# Patient Record
Sex: FEMALE | Race: WHITE | URBAN - METROPOLITAN AREA
[De-identification: names, ages, dates, MRNs, and addresses within clinical notes are randomized per-mention and may not be internally consistent; named-entity substitution may affect disease eponyms.]

---

## 2017-11-16 ENCOUNTER — HOSPITAL ENCOUNTER (EMERGENCY)
Facility: CLINIC | Age: 22
Discharge: HOME | End: 2017-11-16
Attending: PHYSICIAN ASSISTANT | Admitting: PHYSICIAN ASSISTANT

## 2017-11-16 ENCOUNTER — APPOINTMENT (OUTPATIENT)
Dept: ULTRASOUND IMAGING | Facility: CLINIC | Age: 22
End: 2017-11-16
Attending: PHYSICIAN ASSISTANT

## 2017-11-16 VITALS
DIASTOLIC BLOOD PRESSURE: 59 MMHG | SYSTOLIC BLOOD PRESSURE: 95 MMHG | TEMPERATURE: 98.4 F | RESPIRATION RATE: 16 BRPM | OXYGEN SATURATION: 100 %

## 2017-11-16 DIAGNOSIS — K62.5 RECTAL BLEEDING: ICD-10-CM

## 2017-11-16 DIAGNOSIS — J06.9 UPPER RESPIRATORY TRACT INFECTION, UNSPECIFIED TYPE: ICD-10-CM

## 2017-11-16 DIAGNOSIS — Z3A.10 10 WEEKS GESTATION OF PREGNANCY: ICD-10-CM

## 2017-11-16 DIAGNOSIS — R82.71 ASYMPTOMATIC BACTERIURIA DURING PREGNANCY: ICD-10-CM

## 2017-11-16 DIAGNOSIS — O99.891 ASYMPTOMATIC BACTERIURIA DURING PREGNANCY: ICD-10-CM

## 2017-11-16 LAB
ALBUMIN SERPL-MCNC: 3.7 G/DL (ref 3.4–5)
ALBUMIN UR-MCNC: NEGATIVE MG/DL
ALP SERPL-CCNC: 54 U/L (ref 40–150)
ALT SERPL W P-5'-P-CCNC: 14 U/L (ref 0–50)
ANION GAP SERPL CALCULATED.3IONS-SCNC: 8 MMOL/L (ref 3–14)
APPEARANCE UR: ABNORMAL
AST SERPL W P-5'-P-CCNC: 8 U/L (ref 0–45)
B-HCG SERPL-ACNC: ABNORMAL IU/L (ref 0–5)
BACTERIA #/AREA URNS HPF: ABNORMAL /HPF
BASOPHILS # BLD AUTO: 0 10E9/L (ref 0–0.2)
BASOPHILS NFR BLD AUTO: 0.2 %
BILIRUB SERPL-MCNC: 0.5 MG/DL (ref 0.2–1.3)
BILIRUB UR QL STRIP: NEGATIVE
BUN SERPL-MCNC: 9 MG/DL (ref 7–30)
CALCIUM SERPL-MCNC: 9 MG/DL (ref 8.5–10.1)
CHLORIDE SERPL-SCNC: 105 MMOL/L (ref 94–109)
CO2 SERPL-SCNC: 24 MMOL/L (ref 20–32)
COLOR UR AUTO: YELLOW
CREAT SERPL-MCNC: 0.67 MG/DL (ref 0.52–1.04)
DEPRECATED S PYO AG THROAT QL EIA: NORMAL
DIFFERENTIAL METHOD BLD: NORMAL
EOSINOPHIL # BLD AUTO: 0 10E9/L (ref 0–0.7)
EOSINOPHIL NFR BLD AUTO: 0.6 %
ERYTHROCYTE [DISTWIDTH] IN BLOOD BY AUTOMATED COUNT: 12.4 % (ref 10–15)
FLUAV+FLUBV AG SPEC QL: NEGATIVE
FLUAV+FLUBV AG SPEC QL: NEGATIVE
GFR SERPL CREATININE-BSD FRML MDRD: >90 ML/MIN/1.7M2
GLUCOSE SERPL-MCNC: 92 MG/DL (ref 70–99)
GLUCOSE UR STRIP-MCNC: NEGATIVE MG/DL
HCG UR QL: POSITIVE
HCT VFR BLD AUTO: 40.4 % (ref 35–47)
HGB BLD-MCNC: 14 G/DL (ref 11.7–15.7)
HGB UR QL STRIP: NEGATIVE
IMM GRANULOCYTES # BLD: 0 10E9/L (ref 0–0.4)
IMM GRANULOCYTES NFR BLD: 0.3 %
KETONES UR STRIP-MCNC: NEGATIVE MG/DL
LEUKOCYTE ESTERASE UR QL STRIP: ABNORMAL
LYMPHOCYTES # BLD AUTO: 1.2 10E9/L (ref 0.8–5.3)
LYMPHOCYTES NFR BLD AUTO: 17.6 %
MCH RBC QN AUTO: 29.8 PG (ref 26.5–33)
MCHC RBC AUTO-ENTMCNC: 34.7 G/DL (ref 31.5–36.5)
MCV RBC AUTO: 86 FL (ref 78–100)
MONOCYTES # BLD AUTO: 0.3 10E9/L (ref 0–1.3)
MONOCYTES NFR BLD AUTO: 5.1 %
MUCOUS THREADS #/AREA URNS LPF: PRESENT /LPF
NEUTROPHILS # BLD AUTO: 5.1 10E9/L (ref 1.6–8.3)
NEUTROPHILS NFR BLD AUTO: 76.2 %
NITRATE UR QL: NEGATIVE
NRBC # BLD AUTO: 0 10*3/UL
NRBC BLD AUTO-RTO: 0 /100
PH UR STRIP: 6 PH (ref 5–7)
PLATELET # BLD AUTO: 250 10E9/L (ref 150–450)
POTASSIUM SERPL-SCNC: 3.4 MMOL/L (ref 3.4–5.3)
PROT SERPL-MCNC: 7.7 G/DL (ref 6.8–8.8)
RBC # BLD AUTO: 4.7 10E12/L (ref 3.8–5.2)
RBC #/AREA URNS AUTO: 1 /HPF (ref 0–2)
SODIUM SERPL-SCNC: 137 MMOL/L (ref 133–144)
SOURCE: ABNORMAL
SP GR UR STRIP: 1.03 (ref 1–1.03)
SPECIMEN SOURCE: NORMAL
SQUAMOUS #/AREA URNS AUTO: 11 /HPF (ref 0–1)
UROBILINOGEN UR STRIP-MCNC: 0 MG/DL (ref 0–2)
WBC # BLD AUTO: 6.6 10E9/L (ref 4–11)
WBC #/AREA URNS AUTO: 2 /HPF (ref 0–2)
WET PREP SPEC: NORMAL

## 2017-11-16 PROCEDURE — 87491 CHLMYD TRACH DNA AMP PROBE: CPT | Performed by: PHYSICIAN ASSISTANT

## 2017-11-16 PROCEDURE — 84702 CHORIONIC GONADOTROPIN TEST: CPT | Performed by: PHYSICIAN ASSISTANT

## 2017-11-16 PROCEDURE — 99285 EMERGENCY DEPT VISIT HI MDM: CPT | Mod: 25

## 2017-11-16 PROCEDURE — 81001 URINALYSIS AUTO W/SCOPE: CPT | Performed by: PHYSICIAN ASSISTANT

## 2017-11-16 PROCEDURE — 76801 OB US < 14 WKS SINGLE FETUS: CPT

## 2017-11-16 PROCEDURE — 93005 ELECTROCARDIOGRAM TRACING: CPT

## 2017-11-16 PROCEDURE — 96375 TX/PRO/DX INJ NEW DRUG ADDON: CPT

## 2017-11-16 PROCEDURE — 87591 N.GONORRHOEAE DNA AMP PROB: CPT | Performed by: PHYSICIAN ASSISTANT

## 2017-11-16 PROCEDURE — 81025 URINE PREGNANCY TEST: CPT | Performed by: PHYSICIAN ASSISTANT

## 2017-11-16 PROCEDURE — 87880 STREP A ASSAY W/OPTIC: CPT | Performed by: PHYSICIAN ASSISTANT

## 2017-11-16 PROCEDURE — 25000128 H RX IP 250 OP 636: Performed by: PHYSICIAN ASSISTANT

## 2017-11-16 PROCEDURE — 80053 COMPREHEN METABOLIC PANEL: CPT | Performed by: PHYSICIAN ASSISTANT

## 2017-11-16 PROCEDURE — 87081 CULTURE SCREEN ONLY: CPT | Performed by: PHYSICIAN ASSISTANT

## 2017-11-16 PROCEDURE — 87086 URINE CULTURE/COLONY COUNT: CPT | Performed by: PHYSICIAN ASSISTANT

## 2017-11-16 PROCEDURE — 87210 SMEAR WET MOUNT SALINE/INK: CPT | Performed by: PHYSICIAN ASSISTANT

## 2017-11-16 PROCEDURE — 85025 COMPLETE CBC W/AUTO DIFF WBC: CPT | Performed by: PHYSICIAN ASSISTANT

## 2017-11-16 PROCEDURE — 96374 THER/PROPH/DIAG INJ IV PUSH: CPT

## 2017-11-16 PROCEDURE — 87804 INFLUENZA ASSAY W/OPTIC: CPT | Performed by: PHYSICIAN ASSISTANT

## 2017-11-16 PROCEDURE — 96361 HYDRATE IV INFUSION ADD-ON: CPT

## 2017-11-16 RX ORDER — NITROFURANTOIN 25; 75 MG/1; MG/1
100 CAPSULE ORAL 2 TIMES DAILY
Qty: 14 CAPSULE | Refills: 0 | Status: SHIPPED | OUTPATIENT
Start: 2017-11-16

## 2017-11-16 RX ORDER — METOCLOPRAMIDE HYDROCHLORIDE 5 MG/ML
10 INJECTION INTRAMUSCULAR; INTRAVENOUS ONCE
Status: COMPLETED | OUTPATIENT
Start: 2017-11-16 | End: 2017-11-16

## 2017-11-16 RX ORDER — DOXYLAMINE SUCCINATE AND PYRIDOXINE HYDROCHLORIDE, DELAYED RELEASE TABLETS 10 MG/10 MG 10; 10 MG/1; MG/1
2 TABLET, DELAYED RELEASE ORAL 2 TIMES DAILY PRN
Qty: 30 TABLET | Refills: 0 | Status: SHIPPED | OUTPATIENT
Start: 2017-11-16

## 2017-11-16 RX ORDER — DIPHENHYDRAMINE HYDROCHLORIDE 50 MG/ML
25 INJECTION INTRAMUSCULAR; INTRAVENOUS ONCE
Status: COMPLETED | OUTPATIENT
Start: 2017-11-16 | End: 2017-11-16

## 2017-11-16 RX ADMIN — SODIUM CHLORIDE 1000 ML: 9 INJECTION, SOLUTION INTRAVENOUS at 20:20

## 2017-11-16 RX ADMIN — METOCLOPRAMIDE 10 MG: 5 INJECTION, SOLUTION INTRAMUSCULAR; INTRAVENOUS at 21:23

## 2017-11-16 RX ADMIN — DIPHENHYDRAMINE HYDROCHLORIDE 25 MG: 50 INJECTION, SOLUTION INTRAMUSCULAR; INTRAVENOUS at 21:23

## 2017-11-16 ASSESSMENT — ENCOUNTER SYMPTOMS
DIZZINESS: 1
COUGH: 1
BLOOD IN STOOL: 1
ROS GI COMMENTS: RECTAL PAIN
SORE THROAT: 1

## 2017-11-16 NOTE — ED AVS SNAPSHOT
[facility]    [address]    [facility] [address]    Phone:  [phone]    Fax:  [phone]                                       Shira Keys   MRN: [id number]    Department:  [facility]   Date of Visit:  11/16/2017           After Visit Summary Signature Page     I have received my discharge instructions, and my questions have been answered. I have discussed any challenges I see with this plan with the nurse or doctor.    ..........................................................................................................................................  Patient/Patient Representative Signature      ..........................................................................................................................................  Patient Representative Print Name and Relationship to Patient    ..................................................               ................................................  Date                                            Time    ..........................................................................................................................................  Reviewed by Signature/Title    ...................................................              ..............................................  Date                                                            Time

## 2017-11-16 NOTE — ED AVS SNAPSHOT
[facility] Emergency Department    [address]    [facility] [address]    Phone:  [phone]    Fax:  [phone]                                       Shira Keys   MRN: [id number]    Department:  [facility] Emergency Department   Date of Visit:  11/16/2017           Patient Information     Date Of Birth          1995        Your diagnoses for this visit were:     10 weeks gestation of pregnancy     Upper respiratory tract infection, unspecified type     Rectal bleeding, likely internal hemorrhoids     Asymptomatic bacteriuria during pregnancy        You were seen by Zeina Santos PA-C.      Follow-up Information     Follow up with [facility].    Specialty:  EMERGENCY MEDICINE    Why:  If symptoms worsen    [facility]:    [address] [facility] [address] [phone]        Follow up with Erik Perez MD.    Specialty:  OB/Gyn    Contact information:    [address] [place] [address]  [phone]          Follow up with [facility]    Contact information:    [address] [place] [phone]        Discharge Instructions       Rest, fluids, diclegis for nausea, follow up with OB GYN for recheck.   You did have small amount of bacteria in urine. We always treat this in pregnant women. Start macrobid.   For upper respiratory symptoms - supportive measures, tylenol for discomfort, neti pots.   For rectal bleeding - likely internal hemorrhoids, increase fiber to avoid straining, see colorectal if not improving.         24 Hour Appointment Hotline       To make an appointment at any [facility], call [phone] ([phone]). If you don't have a family doctor or clinic, we will help you find one. [facility] are conveniently located to serve the needs of you and your family.             Review of your medicines       START taking        Dose / Directions Last dose taken    Doxylamine-Pyridoxine 10-10 MG Tbec   Commonly known as:  DICLEGIS   Dose:  2 tablet   Quantity:  30 tablet        Take 2 tablets by mouth 2 times daily as needed   Refills:  0        nitroFURantoin (macrocrystal-monohydrate) 100 MG capsule   Commonly known as:  MACROBID   Dose:  100 mg   Quantity:  14 capsule        Take 1 capsule (100 mg) by mouth 2 times daily   Refills:  0                Prescriptions were sent or printed at these locations (2 Prescriptions)                   Other Prescriptions                Printed at Department/Unit printer (2 of 2)         Doxylamine-Pyridoxine (DICLEGIS) 10-10 MG TBEC               nitroFURantoin, macrocrystal-monohydrate, (MACROBID) 100 MG capsule                Procedures and tests performed during your visit     Beta strep group A culture    CBC + differential    Chlamydia trachomatis PCR    Comprehensive metabolic panel    EKG 12 lead    HCG qualitative urine    HCG quantitative pregnancy    IV access    Influenza A/B antigen    Neisseria gonorrhoeae PCR    OB  US 1st trimester w transvag    Orthostatic blood pressure and pulse    Rapid strep screen    UA with Microscopic    Urine Culture    Wet prep      Orders Needing Specimen Collection     None      Pending Results     Date and Time Order Name Status Description    11/16/2017 2223 Urine Culture Preliminary     11/16/2017 2018 Beta strep group A culture Preliminary             Pending Culture Results     Date and Time Order Name Status Description    11/16/2017 2223 Urine Culture Preliminary     11/16/2017 2018 Beta strep group A culture Preliminary             Pending Results Instructions     If you had any lab results that were not finalized at the time of your Discharge, you can call the ED Lab Result RN at [phone]. You will be contacted by this team for any positive Lab results or changes in treatment. The nurses are available 7 days a week from [facility]  to 6:30P.  You can leave a message 24 hours per day and they will return your call.        Test Results From Your Hospital Stay        11/16/2017  8:33 PM      Component Results     Component Value Ref Range & Units Status    WBC 6.6 4.0 - 11.0 10e9/L Final    RBC Count 4.70 3.8 - 5.2 10e12/L Final    Hemoglobin 14.0 11.7 - 15.7 g/dL Final    Hematocrit 40.4 35.0 - 47.0 % Final    MCV 86 78 - 100 fl Final    MCH 29.8 26.5 - 33.0 pg Final    MCHC 34.7 31.5 - 36.5 g/dL Final    RDW 12.4 10.0 - 15.0 % Final    Platelet Count 250 150 - 450 10e9/L Final    Diff Method Automated Method  Final    % Neutrophils 76.2 % Final    % Lymphocytes 17.6 % Final    % Monocytes 5.1 % Final    % Eosinophils 0.6 % Final    % Basophils 0.2 % Final    % Immature Granulocytes 0.3 % Final    Nucleated RBCs 0 0 /100 Final    Absolute Neutrophil 5.1 1.6 - 8.3 10e9/L Final    Absolute Lymphocytes 1.2 0.8 - 5.3 10e9/L Final    Absolute Monocytes 0.3 0.0 - 1.3 10e9/L Final    Absolute Eosinophils 0.0 0.0 - 0.7 10e9/L Final    Absolute Basophils 0.0 0.0 - 0.2 10e9/L Final    Abs Immature Granulocytes 0.0 0 - 0.4 10e9/L Final    Absolute Nucleated RBC 0.0  Final         11/16/2017  8:50 PM      Component Results     Component Value Ref Range & Units Status    Sodium 137 133 - 144 mmol/L Final    Potassium 3.4 3.4 - 5.3 mmol/L Final    Chloride 105 94 - 109 mmol/L Final    Carbon Dioxide 24 20 - 32 mmol/L Final    Anion Gap 8 3 - 14 mmol/L Final    Glucose 92 70 - 99 mg/dL Final    Urea Nitrogen 9 7 - 30 mg/dL Final    Creatinine 0.67 0.52 - 1.04 mg/dL Final    GFR Estimate >90 >60 mL/min/1.7m2 Final    Non  GFR Calc    GFR Estimate If Black >90 >60 mL/min/1.7m2 Final    African American GFR Calc    Calcium 9.0 8.5 - 10.1 mg/dL Final    Bilirubin Total 0.5 0.2 - 1.3 mg/dL Final    Albumin 3.7 3.4 - 5.0 g/dL Final    Protein Total 7.7 6.8 - 8.8 g/dL Final    Alkaline Phosphatase 54 40 - 150 U/L Final    ALT 14 0 - 50 U/L Final     AST 8 0 - 45 U/L Final         11/16/2017  8:40 PM      Component Results     Component    Specimen Description    Throat    Rapid Strep A Screen    NEGATIVE: No Group A streptococcal antigen detected by immunoassay, await culture report.         11/16/2017  8:39 PM      Component Results     Component Value Ref Range & Units Status    HCG Qual Urine Positive (A) NEG^Negative Final    This test is for screening purposes.  Results should be interpreted along with   the clinical picture.  Confirmation testing is available if warranted by   ordering [id number], HCG Quantitative Pregnancy.           11/16/2017  8:39 PM      Component Results     Component Value Ref Range & Units Status    Color Urine Yellow  Final    Appearance Urine Slightly Cloudy  Final    Glucose Urine Negative NEG^Negative mg/dL Final    Bilirubin Urine Negative NEG^Negative Final    Ketones Urine Negative NEG^Negative mg/dL Final    Specific Gravity Urine 1.027 1.003 - 1.035 Final    Blood Urine Negative NEG^Negative Final    pH Urine 6.0 5.0 - 7.0 pH Final    Protein Albumin Urine Negative NEG^Negative mg/dL Final    Urobilinogen mg/dL 0.0 0.0 - 2.0 mg/dL Final    Nitrite Urine Negative NEG^Negative Final    Leukocyte Esterase Urine Trace (A) NEG^Negative Final    Source Midstream Urine  Final    WBC Urine 2 0 - 2 /HPF Final    RBC Urine 1 0 - 2 /HPF Final    Bacteria Urine Few (A) NEG^Negative /HPF Final    Squamous Epithelial /HPF Urine 11 (H) 0 - 1 /HPF Final    Mucous Urine Present (A) NEG^Negative /LPF Final         11/16/2017 10:04 PM      Component Results     Component    Specimen Description    Vagina    Wet Prep    No PMNs seen    Wet Prep    No Trichomonas seen    Wet Prep    No clue cells seen    Wet Prep    No yeast seen         11/17/2017  2:15 PM      Component Results     Component Value Ref Range & Units Status    Specimen Description Vagina  Final    Chlamydia Trachomatis PCR Positive (A) NEG^Negative Final    Positive for C.  trachomatis rRNA by transcription mediated amplification.  As is true for all non-culture methods, a positive specimen obtained from a   patient after therapeutic treatment cannot be interpreted as indicating the   presence of viable C. trachomatis.  Critical Value/Significant Value called to and read back by   Desiree Baires at 1414 on 11.17.17, CN.           11/17/2017 12:28 PM      Component Results     Component Value Ref Range & Units Status    Specimen Descrip Vagina  Final    N Gonorrhea PCR Negative NEG^Negative Final    Negative for N. gonorrhoeae rRNA by transcription mediated amplification.  A negative result by transcription mediated amplification does not preclude   the presence of N. gonorrhoeae infection because results are dependent on   proper and adequate collection, absence of inhibitors, and sufficient rRNA to   be detected.           11/16/2017 10:08 PM      Component Results     Component Value Ref Range & Units Status    Influenza A/B Agn Specimen Nasal  Final    Influenza A Negative NEG^Negative Final    Influenza B Negative NEG^Negative Final    Test results must be correlated with clinical data. If necessary, results   should be confirmed by a molecular assay or viral culture.           11/17/2017 10:55 AM      Component Results     Component    Specimen Description    Throat    Culture Micro    Culture negative < 24 hours, reincubate         11/16/2017 10:45 PM      Narrative     ULTRASOUND OBSTETRIC FIRST TRIMESTER   11/16/2017 9:21 PM     HISTORY: Pregnant. Pelvic pain.    COMPARISON: None.    FINDINGS: A gestational sac is present in the endometrial cavity. A  fetus is present in the gestational sac measuring 3.5 cm in crown-rump  length, corresponding to estimated gestational age of 10 weeks 3 days.  Cardiac activity is detected within the fetus at a rate of 172 bpm. A  yolk sac is visualized. Very small subchorionic hemorrhage. A 5.1 x  4.5 x 4.4 cm mass in the lower left anterior uterine  segment with  peripheral calcification, likely a subserosal fibroid. The right ovary  is unremarkable, measuring 4.0 x 2.9 x 2.8 cm. The left ovary is not  visualized. No adnexal masses. No free fluid in the pelvis.        Impression     IMPRESSION:   1. Single intrauterine pregnancy at 10 weeks 3 days estimated  gestational age based upon crown-rump length measurement on this  study. The estimated date of delivery is 6/11/2018.  2. Very small subchorionic hemorrhage.  3. 5 cm probable fibroid in the lower uterine segment.     JAVED GAMBOA MD         11/16/2017  9:24 PM      Component Results     Component Value Ref Range & Units Status    HCG Quantitative Serum 76336 (H) 0 - 5 IU/L Final         11/17/2017  1:10 AM      Component Results     Component    Specimen Description    Midstream Urine    Special Requests    Specimen received in preservative    Culture Micro    PENDING                Clinical Quality Measure: Blood Pressure Screening     Your blood pressure was checked while you were in the emergency department today. The last reading we obtained was  BP: 95/59 . Please read the guidelines below about what these numbers mean and what you should do about them.  If your systolic blood pressure (the top number) is less than 120 and your diastolic blood pressure (the bottom number) is less than 80, then your blood pressure is normal. There is nothing more that you need to do about it.  If your systolic blood pressure (the top number) is 120-139 or your diastolic blood pressure (the bottom number) is 80-89, your blood pressure may be higher than it should be. You should have your blood pressure rechecked within a year by a primary care provider.  If your systolic blood pressure (the top number) is 140 or greater or your diastolic blood pressure (the bottom number) is 90 or greater, you may have high blood pressure. High blood pressure is treatable, but if left untreated over time it can put you at risk for  "heart attack, stroke, or kidney failure. You should have your blood pressure rechecked by a primary care provider within the next 4 weeks.  If your provider in the emergency department today gave you specific instructions to follow-up with your doctor or provider even sooner than that, you should follow that instruction and not wait for up to 4 weeks for your follow-up visit.        Thank you for choosing [facility]       Thank you for choosing [facility] for your care. Our goal is always to provide you with excellent care. Hearing back from our patients is one way we can continue to improve our services. Please take a few minutes to complete the written survey that you may receive in the mail after you visit with us. Thank you!        [facility] Information     [employer] lets you send messages to your doctor, view your test results, renew your prescriptions, schedule appointments and more. To sign up, go to [email or web address] . Click on \"Log in\" on the left side of the screen, which will take you to the Welcome page. Then click on \"Sign up Now\" on the right side of the page.     You will be asked to enter the access code listed below, as well as some personal information. Please follow the directions to create your username and password.     Your access code is: [email or web address]  Expires: 2018 10:23 PM     Your access code will  in 90 days. If you need help or a new code, please call your [facility] clinic or [phone].        Care EveryWhere ID     This is your Care EveryWhere ID. This could be used by other organizations to access your [facility] medical records  [id number]        Equal Access to Services     EM CAMPOS : Hadii triny frederick Sozulay, waaxda luqadaha, qaybta kaalmapedro johnston . So [facility] [phone].    ATENCIÓN: Si habla español, tiene a jewell disposición servicios gratuitos de asistencia lingüística. Llame al [phone].    We comply with " applicable federal civil rights laws and [place] laws. We do not discriminate on the basis of race, color, national origin, age, disability, sex, sexual orientation, or gender identity.            After Visit Summary       This is your record. Keep this with you and show to your community pharmacist(s) and doctor(s) at your next visit.

## 2017-11-17 ENCOUNTER — TELEPHONE (OUTPATIENT)
Dept: EMERGENCY MEDICINE | Facility: CLINIC | Age: 22
End: 2017-11-17

## 2017-11-17 DIAGNOSIS — A74.9 CHLAMYDIA: ICD-10-CM

## 2017-11-17 DIAGNOSIS — Z91.89 AT RISK FOR NAUSEA: ICD-10-CM

## 2017-11-17 LAB
C TRACH DNA SPEC QL NAA+PROBE: POSITIVE
INTERPRETATION ECG - MUSE: NORMAL
N GONORRHOEA DNA SPEC QL NAA+PROBE: NEGATIVE
SPECIMEN SOURCE: ABNORMAL
SPECIMEN SOURCE: NORMAL

## 2017-11-17 RX ORDER — AZITHROMYCIN 500 MG/1
1000 TABLET, FILM COATED ORAL ONCE
Qty: 2 TABLET | Refills: 0 | Status: SHIPPED | OUTPATIENT
Start: 2017-11-17 | End: 2017-11-17

## 2017-11-17 RX ORDER — ONDANSETRON 4 MG/1
4 TABLET, ORALLY DISINTEGRATING ORAL ONCE
Qty: 1 TABLET | Refills: 0 | Status: SHIPPED | OUTPATIENT
Start: 2017-11-17 | End: 2017-11-17

## 2017-11-17 NOTE — ED NOTES
Patient presents to ED due to multiple symptoms. Pelvic pain, nausea, sore throat, and dizziness   Unsure if she she is pregnant.   Dizziness developed 2 days ago .  ABC intact

## 2017-11-17 NOTE — TELEPHONE ENCOUNTER
[facility] Emergency Department Lab result notification [Adult-Female]    [place] ED lab result protocol used  Chlamydia Protocol    Reason for call  Notify of lab results, assess symptoms,  review ED providers recommendations/discharge instructions (if necessary) and advise per ED lab result f/u protocol    Lab Result (including Rx patient on, if applicable)  Final Chlamydia trachomatis PCR on 11/17/17 is POSITIVE for C. trachomatis rRNA by transcription mediated amplification.  Patient was treated appropriately in the ED [Yes or No]:   No         If Yes, list what was given in the ED:  None  [place] ED discharge antibiotic (if prescribed): Macrobid Take 1 capsule (100 mg) by mouth 2 times daily x 7 days.   If no treatment initiated in the [place] ED, treat per [place] ED Lab Result protocol.    Information table from ED Provider visit on 11/16/17  ED diagnosis Asymptomatic bacteriuria during pregnancy, UTI   ED provider Zeina Santos PA-C   Symptoms reported at ED visit (Chief complaint, HPI) Shira Keys is a 21 year old female who presents to the emergency department today for evaluation of rectal bleeding and a sore throat. The patient reports that for the past two days she has had bright red blood in her stools when she has a bowel movement. She endorses rectal pain and pelvic pain which is exacerbated when she bears down to have a bowel movement but she denies any constipation. She notes that two months ago she had a similar episode of rectal bleeding which resolved spontaneously and she denies any history of hemorrhoids. She also denies any vaginal discharge or urinary symptoms.      Today, the patient notes that she also developed a sore throat with coughing, dizziness, and ear popping. She reports that both of her children have been sick with similar symptoms and she thinks that she got what they have.    ED providers Impression and Plan (applicable information) Shira Keys  is a 21 year old female who presents to the emergency department today with multiple complaints including upper respiratory symptoms, dizziness, pelvic pain with vomiting, and rectal bleeding. In regards to the rectal bleeding, this is clinically consistent with likely internal hemorrhoids. No external hemorrhoids definitively noted on exam. No anal fissure present. No melena or hematochezia concerning for acute GI bleed. Hemoglobin is stable. Advised to increase fluids and fiber to avoid straining and if not improving, see colorectal.      In regards to the upper respiratory symptoms, lungs are clear on exam, oxygen saturations on room air are normal, and she has had no fevers. Given this, I have low suspicion for acute bacterial pneumonia and do not feel that chest x-ray is indicated. Influenza A and B swab was negative. She does have mild pharyngitis on exam, however rapid strep is negative. Throat culture is pending. No signs of retropharyngeal abscess, Lemierre's syndrome, peritonsillar abscess, or Kareem's angina. She did report mild dizziness, that seemed to improve with IV fluids here. No focal findings on exam concerning for acute intracranial process. No orthostatic hypotension here. No chest pain or shortness of breath. ECG does not show any acute ischemic changes thus lower suspicion for acute coronary syndrome. No concerning arrythmia noted on ECG.      In regards to her pelvic pain, she has minimal suprapubic tenderness on exam. No peritoneal signs. Urine pregnancy test was positive which I reviewed with the patient. Quantitative [place] subsequently [place] [id number]. Pelvic ultrasound was obtained which confirmed a single IUP at 10 weeks 3 days. She has a very small subchorionic hemorrhage which I reviewed with the patient including the slight increase in risk of miscarriage associated with this. She was also noted to have a fibroid in the lower uterine segment which she has had previously.  No signs of ovarian torsion or cyst on the right side. Left ovary is not visualized, however, given no notable enlargement and no focal pain/tenderness doubt ovarian torsion/cyst rupture as source of her discomfort. She otherwise has a fairly benign abdomen, thus I do not feel that further advanced imaging is indicated. She did have some bacteria in the urine but no urinary symptoms. Given her pregnancy, we will treat her with an antibiotic for asymptomatic bacteruria. Culture pending. She was able to tolerate oral intake following IV fluids and nausea medication here. Given this, I feel she is safe for discharge home. Will send the patient home with nausea medication. I advised her to follow up with OB GYN which I provided contact information for [facility] given she did not live here in [place] with her previous children. Reviewed reasons to return to ED, including worsening symptoms, high fevers, difficulty breathing, vaginal bleeding, focal abdominal pain, or any new concerns. The patient was in agreement with plan and discharged in satisfactory condition with all questions answered.     Significant Medical hx, if applicable Non3   Coumadin/Warfarin [Yes /No] No   Creatinine Level (mg/dl) 0.67   Creatinine clearance (ml/min), if applicable Not available   Pregnant (Yes/No/NA) Yes   Breastfeeding (Yes/No/NA) No   Allergies NKA   Weight, if applicable Not available      RN Assessment (Patient s current Symptoms), include time called.  [Insert Left message here if message left]  STD Patient Instructions:    We recommend that you contact any recent sexual partners within the last 2 months and have them evaluated by a physician.    Avoid sexual activity for 7 to 10 days or until both your and your partner(s) have completed all antibiotic medications.    We advise that you consider following up with your PCP at approximately 3 months for retesting to be sure the infection has cleared    RN Recommendations/Instructions per  [place] ED lab result protocol  Patient notified of lab result and treatment recommendations.  Rx for Azithromax sent to [Pharmacy - [facility] in [place]].       Please Contact your PCP clinic or return to the Emergency department if your:    Symptoms return.    Symptoms do not improve after 3 days on antibiotic.    Symptoms do not resolve after completing antibiotic.    Symptoms worsen or other concerning symptom's.    PCP follow-up Questions asked: YES       Desiree Baires RN    [facility] RN  Lung Nodule and ED Lab Results F/U RN  Epic pool (ED late result f/u RN) : P [id number]   # [phone]    Copy of Lab result   Order   Chlamydia trachomatis PCR [[id number]] (Order [id number])   Exam Information   Exam Date Exam Time Accession # Results    11/16/17  9:35 PM [id number]    Component Results   Component Value Flag Ref Range Units Status Collected Lab   Specimen Description Vagina    Final 11/16/2017  9:35 PM [id number]   Chlamydia Trachomatis PCR Positive (A) NEG^Negative  Final 11/16/2017  9:35 PM 75

## 2017-11-17 NOTE — DISCHARGE INSTRUCTIONS
Rest, fluids, diclegis for nausea, follow up with OB GYN for recheck.   You did have small amount of bacteria in urine. We always treat this in pregnant women. Start macrobid.   For upper respiratory symptoms - supportive measures, tylenol for discomfort, neti pots.   For rectal bleeding - likely internal hemorrhoids, increase fiber to avoid straining, see colorectal if not improving.

## 2017-11-17 NOTE — ED PROVIDER NOTES
History     Chief Complaint:  Rectal Bleeding and Sore Throat    HPI   Shira Keys is a 21 year old female who presents to the emergency department today for evaluation of rectal bleeding and a sore throat. The patient reports that for the past two days she has had bright red blood in her stools when she has a bowel movement. She endorses rectal pain and pelvic pain which is exacerbated when she bears down to have a bowel movement but she denies any constipation. She notes that two months ago she had a similar episode of rectal bleeding which resolved spontaneously and she denies any history of hemorrhoids. She also denies any vaginal discharge or urinary symptoms.     Today, the patient notes that she also developed a sore throat with coughing, dizziness, and ear popping. She reports that both of her children have been sick with similar symptoms and she thinks that she got what they have.     Allergies:  No Known Drug Allergies    Medications:    Medications reviewed. No current medications.     Past Medical History:    History reviewed. No pertinent past medical history.    Past Surgical History:    ENT surgery     Family History:    History reviewed. No pertinent family history.     Social History:  Smoking Status: Current Every Day Smoker -- 0.25 Packs Per Day   Alcohol Use: Negative     Review of Systems   HENT: Positive for sore throat.         Ear popping   Respiratory: Positive for cough.    Gastrointestinal: Positive for blood in stool.        Rectal pain   Genitourinary: Positive for pelvic pain.   Neurological: Positive for dizziness.   All other systems reviewed and are negative.    Physical Exam     Lying Orthostatic BP - Lying Orthostatic BP: 123/63 ; Lying Orthostatic Pulse: 67 bpm   Sitting Orthostatic BP - Sitting Orthostatic BP: 114/78 ; Sitting Orthostatic Pulse: 79 bpm   Standing Orthostatic BP - Standing Orthostatic BP: 122/74 ; Standing Orthostatic Pulse: 88 bpm  Patient Vitals for the past  24 hrs:   BP Temp Temp src Heart Rate Resp SpO2   11/16/17 2226 - - - - - 100 %   11/16/17 2225 - - - - - 98 %   11/16/17 2223 - - - - - 99 %   11/16/17 2222 - - - - - 100 %   11/16/17 2221 - - - - - 100 %   11/16/17 2220 - - - - - 100 %   11/16/17 2218 - - - - - 99 %   11/16/17 2217 - - - - - 100 %   11/16/17 2216 - - - - - 100 %   11/16/17 2215 95/59 - - - - 98 %   11/16/17 2213 - - - - - 99 %   11/16/17 2210 - - - - - 99 %   11/16/17 2209 - - - - - 98 %   11/16/17 2208 - - - - - 100 %   11/16/17 2207 - - - - - 99 %   11/16/17 2206 - - - - - 100 %   11/16/17 2204 - - - - - 98 %   11/16/17 2203 - - - - - 100 %   11/16/17 2202 - - - - - 99 %   11/16/17 2201 - - - - - 97 %   11/16/17 2200 106/63 - - - - -   11/16/17 2159 - - - - - 100 %   11/16/17 2158 - - - - - 100 %   11/16/17 2157 - - - - - 99 %   11/16/17 2040 - - - - - 100 %   11/16/17 2039 122/74 - - - - 100 %   11/16/17 2038 - - - - - 100 %   11/16/17 2037 114/78 - - - - 100 %   11/16/17 2036 123/63 - - - - -   11/16/17 1958 134/85 - - 89 - -   11/16/17 1955 - 98.4  F (36.9  C) Oral - 16 98 %     Physical Exam   Nursing note and vitals reviewed.     GENERAL: Alert, mild distress, non toxic appearing.   HEENT: Normal conjunctiva. No scleral icterus. MMM. Mild oropharyngeal erythema.   NECK: Supple.  CARDIAC: Normal rate and regular rhythm. Normal heart sounds. No murmurs, rubs, or gallops appreciated.  PULMONARY: CTA bilaterally. Normal breath sounds. No wheezing, crackles, or rhonchi appreciated.  ABDOMEN: Soft, non distended abdomen. Mild suprapubic tenderness. No rebound or guarding.   : Normal external genitalia. No discharge or blood in vault. No CMT. No adnexal tenderness or palpable masses bilaterally.   RECTAL: No external hemorrhoids noted. No anal fissures. Light brown stool noted on digital rectal exam, no melena or hematochezia.   NEURO: Alert and oriented. Non-focal.   MUSCULOSKELETAL: Normal range of motion. No peripheral edema.   SKIN: Skin is  warm and dry. No rashes. No pallor or jaundice.   PSYCH: Normal affect and mood.     Emergency Department Course     ECG:  ECG taken at 2017, ECG read at 2020  Normal sinus rhythm with sinus arrhythmia  Nonspecific ST abnormality   Abnormal ECG   Rate 78 bpm. RI interval 166 ms. QRS duration 84 ms. QT/QTc 396/451 ms. P-R-T axes 31 39 34.    Imaging:  Radiology findings were communicated with the patient who voiced understanding of the findings.    OB  US 1st trimester w transvag  1. Single intrauterine pregnancy at 10 weeks 3 days estimated  gestational age based upon crown-rump length measurement on this  study. The estimated date of delivery is 6/11/2018.  2. Very small subchorionic hemorrhage.  3. 5 cm probable fibroid in the lower uterine segment.   Reading per radiology    Laboratory:  Laboratory findings were communicated with the patient who voiced understanding of the findings.    Influenza A/B Antigen (Specimen: Nasal): Negative    Rapid Strep Screen (Specimen: Throat): Negative   Beta Strep Group A Culture: Pending     CBC: WBC 6.6, HGB 14.0,   CMP: Creatinine 0.67    HCG Quantitative Pregnancy: 90708 (H)  HCG Qualitative Urine: Positive (A)  UA: Leukocyte Esterase: Trace (A), Bacteria: Few (A), Squamous Epithelial/HPF: 11 (H), Mucous: Present (A)      Wet Prep (Specimen: Vagina): Negative   Chlamydia Trachomatis PCR: Pending   Neisseria Gonorrhea PCR: Pending    Interventions:  2020 NS 1000 ml IV   2123 Reglan 10 mg IV  2123 Benadryl 25 mg IV    Emergency Department Course:    2000 Nursing notes and vitals reviewed.    2005 I performed an exam of the patient as documented above.     2019 IV was inserted and blood was drawn for laboratory testing, results above.     2021 The patient provided a urine sample here in the emergency department. This was sent for laboratory testing, findings above.     2044 The patient was sent for a OB  US 1st trimester w transvag while in the emergency department,  results above.      2224 I personally reviewed the laboratory, imaging, and EKG results with the patient and answered all related questions prior to discharge.    Impression & Plan      Medical Decision Making:  Shira Keys is a 21 year old female who presents to the emergency department today with multiple complaints including upper respiratory symptoms, dizziness, pelvic pain with vomiting, and rectal bleeding. In regards to the rectal bleeding, this is clinically consistent with likely internal hemorrhoids. No external hemorrhoids definitively noted on exam. No anal fissure present. No melena or hematochezia concerning for acute GI bleed. Hemoglobin is stable. Advised to increase fluids and fiber to avoid straining and if not improving, see colorectal.     In regards to the upper respiratory symptoms, lungs are clear on exam, oxygen saturations on room air are normal, and she has had no fevers. Given this, I have low suspicion for acute bacterial pneumonia and do not feel that chest x-ray is indicated. Influenza A and B swab was negative. She does have mild pharyngitis on exam, however rapid strep is negative. Throat culture is pending. No signs of retropharyngeal abscess, Lemierre's syndrome, peritonsillar abscess, or Kareem's angina. She did report mild dizziness, that seemed to improve with IV fluids here. No focal findings on exam concerning for acute intracranial process. No orthostatic hypotension here. No chest pain or shortness of breath. ECG does not show any acute ischemic changes thus lower suspicion for acute coronary syndrome. No concerning arrythmia noted on ECG.     In regards to her pelvic pain, she has minimal suprapubic tenderness on exam. No peritoneal signs. Urine pregnancy test was positive which I reviewed with the patient. Quantitative was subsequently obtained and resulted at 97[address]. Pelvic ultrasound was obtained which confirmed a single IUP at 10 weeks 3 days. She has a very small  subchorionic hemorrhage which I reviewed with the patient including the slight increase in risk of miscarriage associated with this. She was also noted to have a fibroid in the lower uterine segment which she has had previously. No signs of ovarian torsion or cyst on the right side. Left ovary is not visualized, however, given no notable enlargement and no focal pain/tenderness doubt ovarian torsion/cyst rupture as source of her discomfort. She otherwise has a fairly benign abdomen, thus I do not feel that further advanced imaging is indicated. She did have some bacteria in the urine but no urinary symptoms. Given her pregnancy, we will treat her with an antibiotic for asymptomatic bacteruria. Culture pending. She was able to tolerate oral intake following IV fluids and nausea medication here. Given this, I feel she is safe for discharge home. Will send the patient home with nausea medication. I advised her to follow up with OB GYN which I provided contact information for [facility] given she did not live here in [place] with her previous children. Reviewed reasons to return to ED, including worsening symptoms, high fevers, difficulty breathing, vaginal bleeding, focal abdominal pain, or any new concerns. The patient was in agreement with plan and discharged in satisfactory condition with all questions answered.      Diagnosis:    ICD-10-CM    1. 10 weeks gestation of pregnancy Z3A.10 Urine Culture   2. Upper respiratory tract infection, unspecified type J06.9    3. Rectal bleeding, likely internal hemorrhoids K62.5    4. Asymptomatic bacteriuria during pregnancy O99.89     R82.71      Disposition:   The patient is discharged to home.    Discharge Medications:  Discharge Medication List as of 11/16/2017 10:24 PM      START taking these medications    Details   Doxylamine-Pyridoxine (DICLEGIS) 10-10 MG TBEC Take 2 tablets by mouth 2 times daily as needed, Disp-30 tablet, R-0, Local Print      nitroFURantoin,  macrocrystal-monohydrate, (MACROBID) 100 MG capsule Take 1 capsule (100 mg) by mouth 2 times daily, Disp-14 capsule, R-0, Local Print           Scribe Disclosure:  I, Den Roca, am serving as a scribe at 8:07 PM on 11/16/2017 to document services personally performed by Zeina Santos PA-C, based on my observations and the provider's statements to me.     [facility] EMERGENCY DEPARTMENT       Zeina Santos PA-C  11/17/17 [id number]

## 2017-11-18 LAB
BACTERIA SPEC CULT: NORMAL
BACTERIA SPEC CULT: NORMAL
Lab: NORMAL
SPECIMEN SOURCE: NORMAL
SPECIMEN SOURCE: NORMAL

## 2018-05-01 ENCOUNTER — HOSPITAL (OUTPATIENT)
Dept: OTHER | Age: 23
End: 2018-05-01
Attending: OBSTETRICS & GYNECOLOGY

## 2018-05-01 LAB
ALBUMIN SERPL-MCNC: 3 GM/DL (ref 3.6–5.1)
ALBUMIN/GLOB SERPL: 0.9 {RATIO} (ref 1–2.4)
ALP SERPL-CCNC: 60 UNIT/L (ref 45–117)
ALT SERPL-CCNC: 19 UNIT/L
AMPHETAMINES UR QL SCN>500 NG/ML: NEGATIVE
ANALYZER ANC (IANC): ABNORMAL
ANION GAP SERPL CALC-SCNC: 16 MMOL/L (ref 10–20)
APPEARANCE UR: CLEAR
AST SERPL-CCNC: 13 UNIT/L
BARBITURATES UR QL SCN>200 NG/ML: NEGATIVE
BASOPHILS # BLD: 0 THOUSAND/MCL (ref 0–0.3)
BASOPHILS NFR BLD: 0 %
BENZODIAZ UR QL SCN>200 NG/ML: NEGATIVE
BILIRUB SERPL-MCNC: 0.7 MG/DL (ref 0.2–1)
BILIRUB UR QL: NEGATIVE
BUN SERPL-MCNC: 8 MG/DL (ref 6–20)
BUN/CREAT SERPL: 13 (ref 7–25)
BZE UR QL SCN>150 NG/ML: NEGATIVE
CALCIUM SERPL-MCNC: 8.5 MG/DL (ref 8.4–10.2)
CANNABINOIDS UR QL SCN>50 NG/ML: POSITIVE
CHLORIDE: 104 MMOL/L (ref 98–107)
CO2 SERPL-SCNC: 21 MMOL/L (ref 21–32)
COLOR UR: YELLOW
CREAT SERPL-MCNC: 0.61 MG/DL (ref 0.51–0.95)
DIFFERENTIAL METHOD BLD: ABNORMAL
EOSINOPHIL # BLD: 0.1 THOUSAND/MCL (ref 0.1–0.5)
EOSINOPHIL NFR BLD: 1 %
ERYTHROCYTE [DISTWIDTH] IN BLOOD: 13.4 % (ref 11–15)
GLOBULIN SER-MCNC: 3.2 GM/DL (ref 2–4)
GLUCOSE SERPL-MCNC: 87 MG/DL (ref 65–99)
GLUCOSE UR-MCNC: NEGATIVE MG/DL
HEMATOCRIT: 30.7 % (ref 36–46.5)
HGB BLD-MCNC: 10.5 GM/DL (ref 12–15.5)
HGB UR QL: NEGATIVE
KETONES UR-MCNC: >80 MG/DL
LEUKOCYTE ESTERASE UR QL STRIP: NEGATIVE
LYMPHOCYTES # BLD: 1.6 THOUSAND/MCL (ref 1–4.8)
LYMPHOCYTES NFR BLD: 25 %
MCH RBC QN AUTO: 28.9 PG (ref 26–34)
MCHC RBC AUTO-ENTMCNC: 34.2 GM/DL (ref 32–36.5)
MCV RBC AUTO: 84.6 FL (ref 78–100)
MICROSCOPIC (MT): ABNORMAL
MONOCYTES # BLD: 0.5 THOUSAND/MCL (ref 0.3–0.9)
MONOCYTES NFR BLD: 7 %
NEUTROPHILS # BLD: 4.3 THOUSAND/MCL (ref 1.8–7.7)
NEUTROPHILS NFR BLD: 67 %
NEUTS SEG NFR BLD: ABNORMAL %
NITRITE UR QL: NEGATIVE
OPIATES UR QL SCN>300 NG/ML: NEGATIVE
PCP UR QL SCN>25 NG/ML: NEGATIVE
PERCENT NRBC: ABNORMAL
PH UR: 6 UNIT (ref 5–7)
PLATELET # BLD: 156 THOUSAND/MCL (ref 140–450)
POTASSIUM SERPL-SCNC: 3.1 MMOL/L (ref 3.4–5.1)
PROT SERPL-MCNC: 6.2 GM/DL (ref 6.4–8.2)
PROT UR QL: ABNORMAL MG/DL
RBC # BLD: 3.63 MILLION/MCL (ref 4–5.2)
SODIUM SERPL-SCNC: 138 MMOL/L (ref 135–145)
SP GR UR: 1.02 (ref 1–1.03)
SPECIMEN SOURCE: ABNORMAL
UROBILINOGEN UR QL: 1 MG/DL (ref 0–1)
WBC # BLD: 6.4 THOUSAND/MCL (ref 4.2–11)

## 2020-06-08 ENCOUNTER — APPOINTMENT (OUTPATIENT)
Dept: URGENT CARE | Age: 25
End: 2020-06-08

## 2021-02-10 ENCOUNTER — TELEPHONE (OUTPATIENT)
Dept: SCHEDULING | Age: 26
End: 2021-02-10

## 2021-02-11 ENCOUNTER — APPOINTMENT (OUTPATIENT)
Dept: INTERNAL MEDICINE | Age: 26
End: 2021-02-11

## 2025-06-11 ENCOUNTER — TELEPHONE (OUTPATIENT)
Dept: SURGERY | Age: 30
End: 2025-06-11

## 2025-06-13 ENCOUNTER — TELEPHONE (OUTPATIENT)
Dept: SURGERY | Age: 30
End: 2025-06-13

## 2025-06-16 ENCOUNTER — APPOINTMENT (OUTPATIENT)
Dept: SURGERY | Age: 30
End: 2025-06-16

## 2025-07-23 ENCOUNTER — TELEPHONE (OUTPATIENT)
Dept: SURGERY | Age: 30
End: 2025-07-23

## 2025-07-28 ENCOUNTER — APPOINTMENT (OUTPATIENT)
Dept: SURGERY | Age: 30
End: 2025-07-28